# Patient Record
Sex: FEMALE | Race: WHITE | Employment: UNEMPLOYED | URBAN - METROPOLITAN AREA
[De-identification: names, ages, dates, MRNs, and addresses within clinical notes are randomized per-mention and may not be internally consistent; named-entity substitution may affect disease eponyms.]

---

## 2024-10-25 ENCOUNTER — TELEPHONE (OUTPATIENT)
Dept: OBSTETRICS AND GYNECOLOGY | Facility: CLINIC | Age: 19
End: 2024-10-25
Payer: COMMERCIAL

## 2024-11-21 ENCOUNTER — LAB VISIT (OUTPATIENT)
Dept: LAB | Facility: HOSPITAL | Age: 19
End: 2024-11-21
Attending: OBSTETRICS & GYNECOLOGY
Payer: COMMERCIAL

## 2024-11-21 ENCOUNTER — INITIAL PRENATAL (OUTPATIENT)
Dept: OBSTETRICS AND GYNECOLOGY | Facility: CLINIC | Age: 19
End: 2024-11-21
Payer: COMMERCIAL

## 2024-11-21 ENCOUNTER — OFFICE VISIT (OUTPATIENT)
Dept: OBSTETRICS AND GYNECOLOGY | Facility: CLINIC | Age: 19
End: 2024-11-21
Payer: COMMERCIAL

## 2024-11-21 VITALS
BODY MASS INDEX: 28.75 KG/M2 | DIASTOLIC BLOOD PRESSURE: 62 MMHG | BODY MASS INDEX: 31.24 KG/M2 | SYSTOLIC BLOOD PRESSURE: 114 MMHG | SYSTOLIC BLOOD PRESSURE: 114 MMHG | DIASTOLIC BLOOD PRESSURE: 62 MMHG | WEIGHT: 178.13 LBS | WEIGHT: 193.56 LBS

## 2024-11-21 DIAGNOSIS — Z34.01 ENCOUNTER FOR SUPERVISION OF NORMAL FIRST PREGNANCY, FIRST TRIMESTER: ICD-10-CM

## 2024-11-21 DIAGNOSIS — O21.9 NAUSEA/VOMITING IN PREGNANCY: ICD-10-CM

## 2024-11-21 DIAGNOSIS — Z32.01 ENCOUNTER FOR PREGNANCY TEST, RESULT POSITIVE: Primary | ICD-10-CM

## 2024-11-21 DIAGNOSIS — Z34.01 ENCOUNTER FOR SUPERVISION OF NORMAL FIRST PREGNANCY, FIRST TRIMESTER: Primary | ICD-10-CM

## 2024-11-21 DIAGNOSIS — Z3A.11 11 WEEKS GESTATION OF PREGNANCY: ICD-10-CM

## 2024-11-21 LAB
HBV SURFACE AG SERPL QL IA: NORMAL
HCV AB SERPL QL IA: NORMAL
HIV 1+2 AB+HIV1 P24 AG SERPL QL IA: NORMAL
TREPONEMA PALLIDUM IGG+IGM AB [PRESENCE] IN SERUM OR PLASMA BY IMMUNOASSAY: NONREACTIVE

## 2024-11-21 PROCEDURE — 99499 UNLISTED E&M SERVICE: CPT | Mod: S$GLB,,, | Performed by: OBSTETRICS & GYNECOLOGY

## 2024-11-21 PROCEDURE — 85025 COMPLETE CBC W/AUTO DIFF WBC: CPT | Performed by: OBSTETRICS & GYNECOLOGY

## 2024-11-21 PROCEDURE — 87340 HEPATITIS B SURFACE AG IA: CPT | Performed by: OBSTETRICS & GYNECOLOGY

## 2024-11-21 PROCEDURE — 86803 HEPATITIS C AB TEST: CPT | Performed by: OBSTETRICS & GYNECOLOGY

## 2024-11-21 PROCEDURE — 99999 PR PBB SHADOW E&M-EST. PATIENT-LVL III: CPT | Mod: PBBFAC,,, | Performed by: OBSTETRICS & GYNECOLOGY

## 2024-11-21 PROCEDURE — 86900 BLOOD TYPING SEROLOGIC ABO: CPT | Performed by: OBSTETRICS & GYNECOLOGY

## 2024-11-21 PROCEDURE — 86762 RUBELLA ANTIBODY: CPT | Performed by: OBSTETRICS & GYNECOLOGY

## 2024-11-21 PROCEDURE — 86787 VARICELLA-ZOSTER ANTIBODY: CPT | Performed by: OBSTETRICS & GYNECOLOGY

## 2024-11-21 PROCEDURE — 87491 CHLMYD TRACH DNA AMP PROBE: CPT | Performed by: OBSTETRICS & GYNECOLOGY

## 2024-11-21 PROCEDURE — 86593 SYPHILIS TEST NON-TREP QUANT: CPT | Performed by: OBSTETRICS & GYNECOLOGY

## 2024-11-21 PROCEDURE — 87661 TRICHOMONAS VAGINALIS AMPLIF: CPT | Performed by: OBSTETRICS & GYNECOLOGY

## 2024-11-21 PROCEDURE — 87389 HIV-1 AG W/HIV-1&-2 AB AG IA: CPT | Performed by: OBSTETRICS & GYNECOLOGY

## 2024-11-21 RX ORDER — PROMETHAZINE HYDROCHLORIDE 25 MG/1
25 TABLET ORAL EVERY 6 HOURS PRN
Qty: 30 TABLET | Refills: 1 | Status: SHIPPED | OUTPATIENT
Start: 2024-11-21

## 2024-11-21 RX ORDER — ASPIRIN 81 MG/1
81 TABLET ORAL DAILY
Qty: 150 TABLET | Refills: 2 | Status: SHIPPED | OUTPATIENT
Start: 2024-11-21 | End: 2025-11-21

## 2024-11-21 RX ORDER — PYRIDOXINE HCL (VITAMIN B6) 25 MG
25 TABLET ORAL 3 TIMES DAILY
Qty: 90 TABLET | Refills: 1 | Status: SHIPPED | OUTPATIENT
Start: 2024-11-21 | End: 2024-12-21

## 2024-11-21 NOTE — PATIENT INSTRUCTIONS
Nausea/Vomiting:   Small meals, bland diet, avoid over eating, avoid empty stomach.   BRAT diet: bananas, rice, apple sauce, & toast.     Convert prenatal vitamins to folic acid supplement only  Rashida Capsule 250mg 4x daily    Vit B6 (over the counter) 25mg oral 3-4x daily  Doxylamine (Unisom) 12.5mg oral 3-4x daily    Dramamine or Benadryl 25mg q4-6hr oral PRN     Promethazine (Phenergan) 25mg q4-6hr orally, rectally, IM, or IV

## 2024-11-21 NOTE — PROGRESS NOTES
Subjective:      Tammi Nicholson is a 19 y.o. female being seen today for her obstetrical visit. She is at 11w2d gestation. Patient reports nausea and vomiting.     OB ROS: no vaginal bleeding, no leakage of fluid, no contractions/cramping, no vaginal discharge. Fetal movement: too early.    Menstrual History:  OB History          1    Para   0    Term   0       0    AB   0    Living   0         SAB   0    IAB   0    Ectopic   0    Multiple   0    Live Births   0                Patient's last menstrual period was 2024.       Objective:      /62   Wt 87.8 kg (193 lb 9 oz)   LMP 2024   BMI 31.24 kg/m²     Vitals  BP: 114/62  Weight: 87.8 kg (193 lb 9 oz)  Prenatal  Fetal Heart Rate: 161  Movement: Absent  Fundal height not measured  -0 kg (-0 oz)           Prenatal Labs:  Lab Results   Component Value Date    [id number] Negative 2024       Assessment:      Pregnancy 11w2d      Plan:     EDC 6/10/2025 by 10wk ultrasound in ED  Nullip: start aspirin   UTI Tx   BMI: 31, recommended 11-20# gain  Nausea and vomiting: Recs  Genetic: discussed possible Quad        1. Encounter for supervision of normal first pregnancy, first trimester  -     Varicella Zoster Antibody, IgG; Future; Expected date: 2024  -     Type & Screen - Ob Profile; Future; Expected date: 2024  -     Rubella Antibody, IgG; Future; Expected date: 2024  -     HIV 1/2 Ag/Ab (4th Gen); Future; Expected date: 2024  -     Hepatitis C Antibody; Future; Expected date: 2024  -     Hepatitis B Surface Antigen; Future; Expected date: 2024  -     CBC Auto Differential; Future; Expected date: 2024  -     Treponema Pallidium Antibodies IgG, IgM; Future; Expected date: 2024  -     Connected MOM Enrollment  -     Assign Connected MOM Program Consent Questionnaire  -     aspirin (ECOTRIN) 81 MG EC tablet; Take 1 tablet (81 mg total) by mouth once daily. At 12 weeks start taking  aspirin daily to prevent preeclampsia.  Dispense: 150 tablet; Refill: 2    2. 11 weeks gestation of pregnancy    3. Nausea/vomiting in pregnancy  -     promethazine (PHENERGAN) 25 MG tablet; Take 1 tablet (25 mg total) by mouth every 6 (six) hours as needed for Nausea.  Dispense: 30 tablet; Refill: 1  -     doxylamine succinate (UNISOM, DOXYLAMINE,) 25 mg tablet; Take 1/2 tablet three times a day to prevent nausea/vomitting in pregnancy  -     pyridoxine, vitamin B6, (B-6) 25 MG Tab; Take 1 tablet (25 mg total) by mouth 3 (three) times daily.  Dispense: 90 tablet; Refill: 1         Follow up in 4 weeks.       I reviewed today her blood pressure, weight gain, urine results and  fetal heart rate.  I have reviewed the previous labs and ultrasound with the patient.   I have answered questions to her satisfaction and total of 20 minutes spend today

## 2024-11-22 LAB
ABO + RH BLD: NORMAL
BASOPHILS # BLD AUTO: 0.02 K/UL (ref 0–0.2)
BASOPHILS NFR BLD: 0.2 % (ref 0–1.9)
BLD GP AB SCN CELLS X3 SERPL QL: NORMAL
DIFFERENTIAL METHOD BLD: NORMAL
EOSINOPHIL # BLD AUTO: 0.1 K/UL (ref 0–0.5)
EOSINOPHIL NFR BLD: 0.9 % (ref 0–8)
ERYTHROCYTE [DISTWIDTH] IN BLOOD BY AUTOMATED COUNT: 12.9 % (ref 11.5–14.5)
HCT VFR BLD AUTO: 40.8 % (ref 37–48.5)
HGB BLD-MCNC: 13.4 G/DL (ref 12–16)
IMM GRANULOCYTES # BLD AUTO: 0.03 K/UL (ref 0–0.04)
IMM GRANULOCYTES NFR BLD AUTO: 0.3 % (ref 0–0.5)
LYMPHOCYTES # BLD AUTO: 2.4 K/UL (ref 1–4.8)
LYMPHOCYTES NFR BLD: 25.2 % (ref 18–48)
MCH RBC QN AUTO: 28.6 PG (ref 27–31)
MCHC RBC AUTO-ENTMCNC: 32.8 G/DL (ref 32–36)
MCV RBC AUTO: 87 FL (ref 82–98)
MONOCYTES # BLD AUTO: 0.7 K/UL (ref 0.3–1)
MONOCYTES NFR BLD: 6.7 % (ref 4–15)
NEUTROPHILS # BLD AUTO: 6.4 K/UL (ref 1.8–7.7)
NEUTROPHILS NFR BLD: 66.7 % (ref 38–73)
NRBC BLD-RTO: 0 /100 WBC
PLATELET # BLD AUTO: 249 K/UL (ref 150–450)
PMV BLD AUTO: 9.8 FL (ref 9.2–12.9)
RBC # BLD AUTO: 4.68 M/UL (ref 4–5.4)
RUBV IGG SER-ACNC: 14.7 IU/ML
RUBV IGG SER-IMP: REACTIVE
VARICELLA INTERPRETATION: NEGATIVE
VARICELLA ZOSTER IGG: 0.97 S/CO
WBC # BLD AUTO: 9.65 K/UL (ref 3.9–12.7)

## 2024-11-23 LAB
SPECIMEN SOURCE: NORMAL
T VAGINALIS RRNA SPEC QL NAA+PROBE: NEGATIVE

## 2024-11-25 LAB
C TRACH DNA SPEC QL NAA+PROBE: NOT DETECTED
N GONORRHOEA DNA SPEC QL NAA+PROBE: NOT DETECTED

## 2024-12-02 NOTE — PROCEDURES
Expand All Collapse All       Subjective:         Subjective  Tammi Nicholson is a 19 y.o. female being seen today for her obstetrical visit. She is at 11w2d gestation. Patient reports nausea and vomiting.      OB ROS: no vaginal bleeding, no leakage of fluid, no contractions/cramping, no vaginal discharge. Fetal movement: too early.     Menstrual History:  OB History            1    Para   0    Term   0       0    AB   0    Living   0           SAB   0    IAB   0    Ectopic   0    Multiple   0    Live Births   0                  Patient's last menstrual period was 2024.     Objective:         Objective  /62   Wt 87.8 kg (193 lb 9 oz)   LMP 2024   BMI 31.24 kg/m²      Vitals  BP: 114/62  Weight: 87.8 kg (193 lb 9 oz)  Prenatal  Fetal Heart Rate: 161  Movement: Absent  Fundal height not measured  -0 kg (-0 oz)                 Prenatal Labs:        Lab Results   Component Value Date     [id number] Negative 2024         Assessment:         Assessment  Pregnancy 11w2d         Plan:      EDC 6/10/2025 by 10wk ultrasound in ED  Nullip: start aspirin   UTI Tx   BMI: 31, recommended 11-20# gain  Nausea and vomiting: Recs  Genetic: discussed possible Quad        Plan  1. Encounter for supervision of normal first pregnancy, first trimester  -     Varicella Zoster Antibody, IgG; Future; Expected date: 2024  -     Type & Screen - Ob Profile; Future; Expected date: 2024  -     Rubella Antibody, IgG; Future; Expected date: 2024  -     HIV 1/2 Ag/Ab (4th Gen); Future; Expected date: 2024  -     Hepatitis C Antibody; Future; Expected date: 2024  -     Hepatitis B Surface Antigen; Future; Expected date: 2024  -     CBC Auto Differential; Future; Expected date: 2024  -     Treponema Pallidium Antibodies IgG, IgM; Future; Expected date: 2024  -     Connected MOM Enrollment  -     Assign Connected MOM Program Consent Questionnaire  -      aspirin (ECOTRIN) 81 MG EC tablet; Take 1 tablet (81 mg total) by mouth once daily. At 12 weeks start taking aspirin daily to prevent preeclampsia.  Dispense: 150 tablet; Refill: 2     2. 11 weeks gestation of pregnancy     3. Nausea/vomiting in pregnancy  -     promethazine (PHENERGAN) 25 MG tablet; Take 1 tablet (25 mg total) by mouth every 6 (six) hours as needed for Nausea.  Dispense: 30 tablet; Refill: 1  -     doxylamine succinate (UNISOM, DOXYLAMINE,) 25 mg tablet; Take 1/2 tablet three times a day to prevent nausea/vomitting in pregnancy  -     pyridoxine, vitamin B6, (B-6) 25 MG Tab; Take 1 tablet (25 mg total) by mouth 3 (three) times daily.  Dispense: 90 tablet; Refill: 1           Follow up in 4 weeks.         I reviewed today her blood pressure, weight gain, urine results and  fetal heart rate.  I have reviewed the previous labs and ultrasound with the patient.   I have answered questions to her satisfaction and total of 20 minutes spend today

## 2024-12-06 ENCOUNTER — TELEPHONE (OUTPATIENT)
Dept: OBSTETRICS AND GYNECOLOGY | Facility: CLINIC | Age: 19
End: 2024-12-06
Payer: COMMERCIAL

## 2024-12-06 NOTE — TELEPHONE ENCOUNTER
Pt called and stated she's having green vaginal discharge and dont have time to come in today due to work. Pt wanted to be seen Monday since she's off . Pt understood provider , time and date.

## 2024-12-09 ENCOUNTER — ROUTINE PRENATAL (OUTPATIENT)
Dept: OBSTETRICS AND GYNECOLOGY | Facility: CLINIC | Age: 19
End: 2024-12-09
Payer: COMMERCIAL

## 2024-12-09 VITALS
DIASTOLIC BLOOD PRESSURE: 68 MMHG | SYSTOLIC BLOOD PRESSURE: 110 MMHG | BODY MASS INDEX: 28.82 KG/M2 | WEIGHT: 178.56 LBS

## 2024-12-09 DIAGNOSIS — N76.0 ACUTE VAGINITIS: ICD-10-CM

## 2024-12-09 DIAGNOSIS — Z3A.13 13 WEEKS GESTATION OF PREGNANCY: Primary | ICD-10-CM

## 2024-12-09 LAB
BILIRUBIN, UA POC OHS: NEGATIVE
BLOOD, UA POC OHS: NEGATIVE
CLARITY, UA POC OHS: CLEAR
COLOR, UA POC OHS: YELLOW
GLUCOSE, UA POC OHS: NEGATIVE
KETONES, UA POC OHS: NEGATIVE
LEUKOCYTES, UA POC OHS: ABNORMAL
NITRITE, UA POC OHS: NEGATIVE
PH, UA POC OHS: 7
PROTEIN, UA POC OHS: NEGATIVE
SPECIFIC GRAVITY, UA POC OHS: 1.02
UROBILINOGEN, UA POC OHS: 0.2

## 2024-12-09 PROCEDURE — 87491 CHLMYD TRACH DNA AMP PROBE: CPT

## 2024-12-09 PROCEDURE — 0502F SUBSEQUENT PRENATAL CARE: CPT | Mod: CPTII,S$GLB,,

## 2024-12-09 PROCEDURE — 99999 PR PBB SHADOW E&M-EST. PATIENT-LVL III: CPT | Mod: PBBFAC,,,

## 2024-12-09 PROCEDURE — 0352U VAGINOSIS SCREEN BY DNA PROBE: CPT

## 2024-12-09 RX ORDER — TERCONAZOLE 8 MG/G
1 CREAM VAGINAL NIGHTLY
Qty: 20 G | Refills: 0 | Status: SHIPPED | OUTPATIENT
Start: 2024-12-09 | End: 2024-12-12

## 2024-12-11 NOTE — PROGRESS NOTES
The patient presents with complaints of green vaginal discharge for the last week. Denies bleeding or leaking. No urinary concerns.     2024  19 y.o. 14w1d Estimated Date of Delivery: 6/10/25, dating reviewed.   OB History    Para Term  AB Living   1 0 0 0 0 0   SAB IAB Ectopic Multiple Live Births   0 0 0 0 0      # Outcome Date GA Lbr Koko/2nd Weight Sex Type Anes PTL Lv   1 Current                Prenatal labs reviewed and updated today    Review of Systems:  General ROS: negative for headache or visual changes  Breast ROS: negative for breast lumps  Gastrointestinal ROS: negative for constipation, diarrhea or nausea/vomiting  Musculoskeletal ROS: negative for pain in joints or swelling in face or hands.   Neurological ROS: negative for - headaches, numbness/tingling or visual changes      Physical Exam:  /68   Wt 81 kg (178 lb 9.2 oz)   LMP 2024   BMI 28.82 kg/m²   Urine Dip: Pending  Fundal Height: deferred   Fetal Heart Tones: 143 bpm  Constitutional: She is oriented to person, place, and time. She appears well-developed and well-nourished. No distress. Normal weight   Cardiovascular: Normal rate.    Pulmonary/Chest: Effort normal. No respiratory distress  Abdominal: Soft, gravid, nontender. No rebound and no guarding.     Genitourinary: SSE performed. Thick, greenish discharge noted. Cervix appears closed. No bleeding    Musculoskeletal: Normal range of motion, no peripheral edema.   Neurological: She is alert and oriented to person, place, and time. Coordination normal.   Skin: Skin is warm and dry. She is not diaphoretic.  Psychiatric: She has a normal mood and affect.        Assessment:  19 y.o., at 14w1d Gestation   There is no problem list on file for this patient.    Current Outpatient Medications on File Prior to Visit   Medication Sig Dispense Refill    aspirin (ECOTRIN) 81 MG EC tablet Take 1 tablet (81 mg total) by mouth once daily. At 12 weeks start taking aspirin  daily to prevent preeclampsia. 150 tablet 2    prenatal 21-iron fu-folic acid 14 mg iron- 400 mcg Tab Take by mouth.      promethazine (PHENERGAN) 25 MG tablet Take 1 tablet (25 mg total) by mouth every 6 (six) hours as needed for Nausea. 30 tablet 1    doxylamine succinate (UNISOM, DOXYLAMINE,) 25 mg tablet Take 1/2 tablet three times a day to prevent nausea/vomitting in pregnancy (Patient not taking: Reported on 12/9/2024)      pyridoxine, vitamin B6, (B-6) 25 MG Tab Take 1 tablet (25 mg total) by mouth 3 (three) times daily. (Patient not taking: Reported on 12/9/2024) 90 tablet 1     No current facility-administered medications on file prior to visit.       Plan:   Labs today: vaginal cultures obtained  Orders today: none  MEds today: Terazol 3 sent to preferred pharmacy   Procedures Today: none  Follow up 2 Weeks, bleeding/pain precautions, kick counts, labor precautions

## 2024-12-13 ENCOUNTER — TELEPHONE (OUTPATIENT)
Dept: OBSTETRICS AND GYNECOLOGY | Facility: CLINIC | Age: 19
End: 2024-12-13
Payer: COMMERCIAL

## 2024-12-13 NOTE — TELEPHONE ENCOUNTER
----- Message from Eddie sent at 12/13/2024  3:09 PM CST -----  Contact: Self  Type: Needs Medical Advice    Who Called:  Patient    Best Call Back Number: [phone]    Additional Information: Patient is requesting a call back as soon as possible.

## 2024-12-13 NOTE — TELEPHONE ENCOUNTER
Pt wanted to confirm if she can use monistat7 Over the counter. Told p[t she can . Pt had no further question or concerns.

## 2024-12-14 LAB
BACTERIAL VAGINOSIS DNA: NOT DETECTED
C TRACH DNA SPEC QL NAA+PROBE: NOT DETECTED
CANDIDA GLABRATA/KRUSEI: NOT DETECTED
CANDIDA RRNA VAG QL PROBE: NOT DETECTED
N GONORRHOEA DNA SPEC QL NAA+PROBE: NOT DETECTED
TRICHOMONAS VAGINALIS: NOT DETECTED

## 2024-12-19 ENCOUNTER — ROUTINE PRENATAL (OUTPATIENT)
Dept: OBSTETRICS AND GYNECOLOGY | Facility: CLINIC | Age: 19
End: 2024-12-19
Payer: COMMERCIAL

## 2024-12-19 VITALS
WEIGHT: 177.94 LBS | SYSTOLIC BLOOD PRESSURE: 120 MMHG | BODY MASS INDEX: 28.72 KG/M2 | DIASTOLIC BLOOD PRESSURE: 72 MMHG

## 2024-12-19 DIAGNOSIS — Z34.01 ENCOUNTER FOR SUPERVISION OF NORMAL FIRST PREGNANCY, FIRST TRIMESTER: Primary | ICD-10-CM

## 2024-12-19 LAB
BILIRUBIN, UA POC OHS: NEGATIVE
BLOOD, UA POC OHS: NEGATIVE
CLARITY, UA POC OHS: CLEAR
COLOR, UA POC OHS: YELLOW
GLUCOSE, UA POC OHS: NEGATIVE
KETONES, UA POC OHS: NEGATIVE
LEUKOCYTES, UA POC OHS: ABNORMAL
NITRITE, UA POC OHS: NEGATIVE
PH, UA POC OHS: 7
PROTEIN, UA POC OHS: ABNORMAL
SPECIFIC GRAVITY, UA POC OHS: 1.02
UROBILINOGEN, UA POC OHS: 1

## 2024-12-19 PROCEDURE — 87086 URINE CULTURE/COLONY COUNT: CPT | Performed by: OBSTETRICS & GYNECOLOGY

## 2024-12-19 PROCEDURE — 99999 PR PBB SHADOW E&M-EST. PATIENT-LVL III: CPT | Mod: PBBFAC,,, | Performed by: OBSTETRICS & GYNECOLOGY

## 2024-12-19 NOTE — PROGRESS NOTES
Subjective:      Tammi Nicholson is a 19 y.o. female being seen today for her obstetrical visit. She is at 15w2d gestation. Patient reports no complaints.     OB ROS: no vaginal bleeding, no leakage of fluid, no contractions/cramping, no vaginal discharge. Fetal movement: too early.    Menstrual History:  OB History          1    Para   0    Term   0       0    AB   0    Living   0         SAB   0    IAB   0    Ectopic   0    Multiple   0    Live Births   0                Patient's last menstrual period was 2024.       Objective:      /72   Wt 80.7 kg (177 lb 14.6 oz)   LMP 2024   BMI 28.72 kg/m²     Vitals  BP: 120/72  Weight: 80.7 kg (177 lb 14.6 oz)  Prenatal  Fetal Heart Rate: 144  Movement: Absent  Fundal height not measured  -7.1 kg (-15 lb 10.4 oz)           Prenatal Labs:  Lab Results   Component Value Date    GROUPTRH O POS 2024    HGB 13.4 2024    HCT 40.8 2024     2024    RUBELLAIMMUN Reactive 2024    HEPBSAG Non-reactive 2024    [id number] Non-reactive 2024    LABCHLA Not Detected 2024    LABNGO Not Detected 2024    [id number] Negative 2024       Assessment:      Pregnancy 15w2d      Plan:     EDC 6/10/2025 by 10wk ultrasound in ED  Nullip: aspirin   UTI Tx   BMI: 31, recommended 11-20# gain  Nausea and vomiting: Recs, lost 15#; improved with Zofran. No vomiting in 2nd tri.   Genetic: declines  Vaccines: flu 2024  Given [facility] class info        1. Encounter for supervision of normal first pregnancy, first trimester  -     POCT Urinalysis(Instrument)  -     US OB 14+ Wks TransAbd & TransVag, Single Gestation (XPD); Future; Expected date: 2024  -     CULTURE, URINE  -     influenza (Egg-FREE) (Flucelvax) 45 mcg/0.5 mL IM vaccine (> or = 6 mo) 0.5 mL         Follow up in 4 weeks.       I reviewed today her blood pressure, weight gain, urine results and  fetal heart rate.  I have reviewed the  previous labs and ultrasound with the patient.   I have answered questions to her satisfaction and total of 20 minutes spend today

## 2024-12-20 LAB — BACTERIA UR CULT: NO GROWTH

## 2024-12-24 ENCOUNTER — PATIENT MESSAGE (OUTPATIENT)
Dept: OTHER | Facility: OTHER | Age: 19
End: 2024-12-24
Payer: COMMERCIAL

## 2025-01-16 ENCOUNTER — HOSPITAL ENCOUNTER (OUTPATIENT)
Dept: RADIOLOGY | Facility: HOSPITAL | Age: 20
Discharge: HOME | End: 2025-01-16
Attending: OBSTETRICS & GYNECOLOGY
Payer: COMMERCIAL

## 2025-01-16 ENCOUNTER — ROUTINE PRENATAL (OUTPATIENT)
Dept: OBSTETRICS AND GYNECOLOGY | Facility: CLINIC | Age: 20
End: 2025-01-16
Payer: COMMERCIAL

## 2025-01-16 VITALS
WEIGHT: 184.94 LBS | DIASTOLIC BLOOD PRESSURE: 72 MMHG | SYSTOLIC BLOOD PRESSURE: 118 MMHG | BODY MASS INDEX: 29.85 KG/M2

## 2025-01-16 DIAGNOSIS — O28.3 ABNORMAL FETAL ULTRASOUND: ICD-10-CM

## 2025-01-16 DIAGNOSIS — Z34.01 ENCOUNTER FOR SUPERVISION OF NORMAL FIRST PREGNANCY, FIRST TRIMESTER: ICD-10-CM

## 2025-01-16 DIAGNOSIS — K21.9 GASTROESOPHAGEAL REFLUX DISEASE WITHOUT ESOPHAGITIS: ICD-10-CM

## 2025-01-16 DIAGNOSIS — Z34.01 ENCOUNTER FOR SUPERVISION OF NORMAL FIRST PREGNANCY, FIRST TRIMESTER: Primary | ICD-10-CM

## 2025-01-16 LAB
BILIRUBIN, UA POC OHS: NEGATIVE
BLOOD, UA POC OHS: NEGATIVE
CLARITY, UA POC OHS: CLEAR
COLOR, UA POC OHS: YELLOW
GLUCOSE, UA POC OHS: NEGATIVE
KETONES, UA POC OHS: NEGATIVE
LEUKOCYTES, UA POC OHS: ABNORMAL
NITRITE, UA POC OHS: NEGATIVE
PH, UA POC OHS: 7
PROTEIN, UA POC OHS: NEGATIVE
SPECIFIC GRAVITY, UA POC OHS: 1.01
UROBILINOGEN, UA POC OHS: 1

## 2025-01-16 PROCEDURE — 0502F SUBSEQUENT PRENATAL CARE: CPT | Mod: CPTII,S$GLB,, | Performed by: OBSTETRICS & GYNECOLOGY

## 2025-01-16 PROCEDURE — 76805 OB US >/= 14 WKS SNGL FETUS: CPT | Mod: TC,PN

## 2025-01-16 PROCEDURE — 99999 PR PBB SHADOW E&M-EST. PATIENT-LVL III: CPT | Mod: PBBFAC,,, | Performed by: OBSTETRICS & GYNECOLOGY

## 2025-01-16 PROCEDURE — 76805 OB US >/= 14 WKS SNGL FETUS: CPT | Mod: 26,,, | Performed by: RADIOLOGY

## 2025-01-16 RX ORDER — OMEPRAZOLE 20 MG/1
20 CAPSULE, DELAYED RELEASE ORAL DAILY
Qty: 30 CAPSULE | Refills: 11 | Status: SHIPPED | OUTPATIENT
Start: 2025-01-16 | End: 2026-01-16

## 2025-01-16 NOTE — PROGRESS NOTES
Subjective:      Tammi Nicholson is a 19 y.o. female being seen today for her obstetrical visit. She is at 19w2d gestation. Patient reports heartburn.     OB ROS: no vaginal bleeding, no leakage of fluid, no contractions/cramping, no vaginal discharge. Fetal movement: normal.    Menstrual History:  OB History          1    Para   0    Term   0       0    AB   0    Living   0         SAB   0    IAB   0    Ectopic   0    Multiple   0    Live Births   0                Patient's last menstrual period was 2024.       Objective:      /72   Wt 83.9 kg (184 lb 15.5 oz)   LMP 2024   BMI 29.85 kg/m²     Vitals  BP: 118/72  Weight: 83.9 kg (184 lb 15.5 oz)  Prenatal  Fetal Heart Rate: 142 U/S  Movement: Present  Fundal height not measured  -3.9 kg (-8 lb 9.6 oz)           Prenatal Labs:  Lab Results   Component Value Date    GROUPTRH O POS 2024    HGB 13.4 2024    HCT 40.8 2024     2024    RUBELLAIMMUN Reactive 2024    HEPBSAG Non-reactive 2024    [id number] Non-reactive 2024    LABCHLA Not Detected 2024    LABNGO Not Detected 2024    LABURIN No growth 2024    [id number] Negative 2024       Assessment:      Pregnancy 19w2d      Plan:     EDC 6/10/2025 by 10wk ultrasound in ED  Suboptimal LVOT, nose/lips  Nullip: aspirin   UTI Tx   BMI: 31, recommended 11-20# gain  Nausea and vomiting: Recs, lost 15#; improved with Zofran. No vomiting in 2nd tri.   Genetic: declines  Vaccines: flu 2024  Given [facility] class info  Peds: unsure  Epidural or natural  Contraception: desires  Breastfeeding, needs new pump.   GERD: rx sent.         1. Encounter for supervision of normal first pregnancy, first trimester  -     POCT Urinalysis(Instrument)    2. Abnormal fetal ultrasound  -     US OB Limited 1 Or More Gestations; Future; Expected date: 2025    3. Gastroesophageal reflux disease without esophagitis  -     omeprazole  (PRILOSEC) 20 MG capsule; Take 1 capsule (20 mg total) by mouth once daily.  Dispense: 30 capsule; Refill: 11         Follow up in 4 weeks.       I reviewed today her blood pressure, weight gain, urine results and  fetal heart rate.  I have reviewed the previous labs and ultrasound with the patient.   I have answered questions to her satisfaction and total of 20 minutes spend today

## 2025-01-21 ENCOUNTER — PATIENT MESSAGE (OUTPATIENT)
Dept: OTHER | Facility: OTHER | Age: 20
End: 2025-01-21
Payer: COMMERCIAL

## 2025-01-31 ENCOUNTER — TELEPHONE (OUTPATIENT)
Dept: OBSTETRICS AND GYNECOLOGY | Facility: CLINIC | Age: 20
End: 2025-01-31
Payer: COMMERCIAL

## 2025-01-31 NOTE — TELEPHONE ENCOUNTER
----- Message from Judy sent at 1/31/2025 12:42 PM CST -----  Type:  Needs Medical Advice    Who Called: pt    Symptoms (please be specific): na     How long has patient had these symptoms:  na    Pharmacy name and phone #:  na    Would the patient rather a call back or a response via [facility]? Call back    Best Call Back Number: [phone]      Additional Information: pt is calling to speak with someone in the office in regards to [employer] BP      Please call Back to advise. Thanks!

## 2025-01-31 NOTE — TELEPHONE ENCOUNTER
Returned pt call , I told pt I will send a message to  regarding her Breast Pump RX. Pt understood and had no further questions.

## 2025-02-13 ENCOUNTER — ROUTINE PRENATAL (OUTPATIENT)
Dept: OBSTETRICS AND GYNECOLOGY | Facility: CLINIC | Age: 20
End: 2025-02-13
Payer: COMMERCIAL

## 2025-02-13 ENCOUNTER — HOSPITAL ENCOUNTER (OUTPATIENT)
Dept: RADIOLOGY | Facility: HOSPITAL | Age: 20
Discharge: HOME | End: 2025-02-13
Attending: OBSTETRICS & GYNECOLOGY
Payer: COMMERCIAL

## 2025-02-13 VITALS
DIASTOLIC BLOOD PRESSURE: 70 MMHG | BODY MASS INDEX: 31.85 KG/M2 | SYSTOLIC BLOOD PRESSURE: 112 MMHG | WEIGHT: 197.31 LBS

## 2025-02-13 DIAGNOSIS — Z30.9 ENCOUNTER FOR CONTRACEPTIVE MANAGEMENT, UNSPECIFIED TYPE: ICD-10-CM

## 2025-02-13 DIAGNOSIS — O28.3 ABNORMAL FETAL ULTRASOUND: ICD-10-CM

## 2025-02-13 DIAGNOSIS — Z34.01 ENCOUNTER FOR SUPERVISION OF NORMAL FIRST PREGNANCY, FIRST TRIMESTER: Primary | ICD-10-CM

## 2025-02-13 LAB
BILIRUBIN, UA POC OHS: NEGATIVE
BLOOD, UA POC OHS: NEGATIVE
CLARITY, UA POC OHS: CLEAR
COLOR, UA POC OHS: YELLOW
GLUCOSE, UA POC OHS: NEGATIVE
KETONES, UA POC OHS: NEGATIVE
LEUKOCYTES, UA POC OHS: ABNORMAL
NITRITE, UA POC OHS: NEGATIVE
PH, UA POC OHS: 8.5
PROTEIN, UA POC OHS: ABNORMAL
SPECIFIC GRAVITY, UA POC OHS: 1.02
UROBILINOGEN, UA POC OHS: 0.2

## 2025-02-13 PROCEDURE — 76815 OB US LIMITED FETUS(S): CPT | Mod: TC,PN

## 2025-02-13 PROCEDURE — 87086 URINE CULTURE/COLONY COUNT: CPT | Performed by: OBSTETRICS & GYNECOLOGY

## 2025-02-13 PROCEDURE — 99999 PR PBB SHADOW E&M-EST. PATIENT-LVL III: CPT | Mod: PBBFAC,,, | Performed by: OBSTETRICS & GYNECOLOGY

## 2025-02-13 PROCEDURE — 76815 OB US LIMITED FETUS(S): CPT | Mod: 26,,, | Performed by: RADIOLOGY

## 2025-02-13 NOTE — PROGRESS NOTES
Subjective:      Tammi Nicholson is a 19 y.o. female being seen today for her obstetrical visit. She is at 23w2d gestation. Patient reports no complaints.     OB ROS: no vaginal bleeding, no leakage of fluid, no contractions/cramping, no vaginal discharge. Fetal movement: normal.    Menstrual History:  OB History          1    Para   0    Term   0       0    AB   0    Living   0         SAB   0    IAB   0    Ectopic   0    Multiple   0    Live Births   0                Patient's last menstrual period was 2024.       Objective:      /70   Wt 89.5 kg (197 lb 5 oz)   LMP 2024   BMI 31.85 kg/m²     Vitals  BP: 112/70  Weight: 89.5 kg (197 lb 5 oz)  Prenatal  Fetal Heart Rate: 152  Movement: Present  Fundal height not measured  1.7 kg (3 lb 12 oz)           Prenatal Labs:  Lab Results   Component Value Date    GROUPTRH O POS 2024    HGB 13.4 2024    HCT 40.8 2024     2024    RUBELLAIMMUN Reactive 2024    HEPBSAG Non-reactive 2024    [id number] Non-reactive 2024    LABCHLA Not Detected 2024    LABNGO Not Detected 2024    LABURIN No growth 2024    [id number] Negative 2024       Assessment:      Pregnancy 23w2d      Plan:     EDC 6/10/2025 by 10wk ultrasound in ED  Nullip: aspirin   UTI Tx   BMI: 31, recommended 11-20# gain  Nausea and vomiting: Recs, lost 15#; improved with Zofran. No vomiting in 2nd tri.   Genetic: declines  Vaccines: flu 2024  Given [facility] class info  Peds: unsure  Epidural or natural  Contraception: desires  Breastfeeding, needs new pump.         1. Encounter for supervision of normal first pregnancy, first trimester  -     POCT Urinalysis(Instrument)  -     OB Glucose Screen; Future; Expected date: 2025    2. Encounter for contraceptive management, unspecified type  -     Cancel: Device Authorization Order         Follow up in 4 weeks.       I reviewed today her blood pressure,  weight gain, urine results and  fetal heart rate.  I have reviewed the previous labs and ultrasound with the patient.   I have answered questions to her satisfaction and total of 20 minutes spend today

## 2025-02-15 LAB
BACTERIA UR CULT: NORMAL
BACTERIA UR CULT: NORMAL

## 2025-02-16 DIAGNOSIS — K21.9 GASTROESOPHAGEAL REFLUX DISEASE WITHOUT ESOPHAGITIS: ICD-10-CM

## 2025-02-17 RX ORDER — OMEPRAZOLE 20 MG/1
20 CAPSULE, DELAYED RELEASE ORAL DAILY
Qty: 30 CAPSULE | Refills: 11 | Status: SHIPPED | OUTPATIENT
Start: 2025-02-17 | End: 2026-02-17

## 2025-02-17 NOTE — TELEPHONE ENCOUNTER
Refill Routing Note   Medication(s) are not appropriate for processing by [facility] for the following reason(s):        New or recently adjusted medication  Drug-disease interaction    [facility] action(s):  Defer      Medication Therapy Plan: pregnancy      Appointments  past 12m or future 3m with PCP    Date Provider   Last Visit   2/13/2025 Soha Mello MD   Next Visit   3/13/2025 Soha Mello MD   ED visits in past 90 days: 0        Note composed:10:32 AM 02/17/2025

## 2025-02-18 ENCOUNTER — PATIENT MESSAGE (OUTPATIENT)
Dept: OTHER | Facility: OTHER | Age: 20
End: 2025-02-18
Payer: COMMERCIAL

## 2025-02-28 ENCOUNTER — ROUTINE PRENATAL (OUTPATIENT)
Dept: OBSTETRICS AND GYNECOLOGY | Facility: CLINIC | Age: 20
End: 2025-02-28
Payer: COMMERCIAL

## 2025-02-28 VITALS
SYSTOLIC BLOOD PRESSURE: 120 MMHG | WEIGHT: 203.25 LBS | BODY MASS INDEX: 32.81 KG/M2 | DIASTOLIC BLOOD PRESSURE: 78 MMHG

## 2025-02-28 DIAGNOSIS — Z11.3 SCREENING FOR STDS (SEXUALLY TRANSMITTED DISEASES): ICD-10-CM

## 2025-02-28 DIAGNOSIS — O26.872 SHORT CERVIX, ANTEPARTUM, SECOND TRIMESTER: Primary | ICD-10-CM

## 2025-02-28 DIAGNOSIS — O09.892 RISK OF PRETERM LABOR IN SECOND TRIMESTER: ICD-10-CM

## 2025-02-28 DIAGNOSIS — Z34.01 ENCOUNTER FOR SUPERVISION OF NORMAL FIRST PREGNANCY, FIRST TRIMESTER: Primary | ICD-10-CM

## 2025-02-28 PROCEDURE — 81515 NFCT DS BV&VAGINITIS DNA ALG: CPT | Performed by: OBSTETRICS & GYNECOLOGY

## 2025-02-28 PROCEDURE — 87591 N.GONORRHOEAE DNA AMP PROB: CPT | Performed by: OBSTETRICS & GYNECOLOGY

## 2025-02-28 PROCEDURE — 99999 PR PBB SHADOW E&M-EST. PATIENT-LVL III: CPT | Mod: PBBFAC,,, | Performed by: OBSTETRICS & GYNECOLOGY

## 2025-02-28 RX ORDER — PROGESTERONE 200 MG/1
200 CAPSULE ORAL
COMMUNITY
Start: 2025-02-24

## 2025-02-28 NOTE — PROGRESS NOTES
Subjective:      Tammi Nicholson is a 19 y.o. female being seen today for her obstetrical visit. She is at 25w3d gestation. Patient reports  seen in ER for pain, diagnostic with short cervix, advised to follow up. States pain improved.  .     OB ROS: no vaginal bleeding, no leakage of fluid, no contractions/cramping, no vaginal discharge. Fetal movement: normal.    Menstrual History:  OB History          1    Para   0    Term   0       0    AB   0    Living   0         SAB   0    IAB   0    Ectopic   0    Multiple   0    Live Births   0                Patient's last menstrual period was 2024.       Objective:      /78   Wt 92.2 kg (203 lb 4.2 oz)   LMP 2024   BMI 32.81 kg/m²     Vitals  BP: 120/78  Weight: 92.2 kg (203 lb 4.2 oz)  Prenatal  Fetal Heart Rate: 144  Movement: Present  Dilation/Effacement/Station  Dilation: Closed  Fundal height not measured  4.4 kg (9 lb 11.2 oz)    Speculum exam: no vaginal discharge, no bleeding, cervix closed.        Prenatal Labs:  Lab Results   Component Value Date    GROUPTRH O POS 2024    HGB 13.4 2024    HCT 40.8 2024     2024    RUBELLAIMMUN Reactive 2024    HEPBSAG Non-reactive 2024    [id number] Non-reactive 2024    LABCHLA Not Detected 2024    LABNGO Not Detected 2024    LABURIN  2025     Multiple organisms isolated. None in predominance.  Repeat if    LABURIN clinically necessary. 2025    [id number] Negative 2024       Assessment:      Pregnancy 25w3d      Plan:     EDC 6/10/2025 by 10wk ultrasound in ED  Nullip: aspirin   UTI Tx   BMI: 31, recommended 11-20# gain  Nausea and vomiting: Recs, lost 15#; improved with Zofran. No vomiting in 2nd tri.   Genetic: declines  Vaccines: flu 2024  Given [facility] cla  ss info  Peds: unsure  Epidural or natural  Contraception: desires  Breastfeeding, needs new pump.     Seen at  ER at 24.6wga, cervix 22mm, & FFN  negative, started on vaginal progesterone by Dr. Ramos. A short cervix (defined here as <30 mm) before 34 w  eeks of gestation is predictive of an increased risk for  birth in all populations.   Closed today, cramping improved. Referral to [facility] sent.         1. Encounter for supervision of normal first pregnancy, first trimester  -     POCT Urinalysis(Instrument)    2. Risk of  labor in second trimester  -     Ambulatory referral/consult to Perinatology; Future; Expected date: 2025         Follow up in 4 weeks.       I reviewed today her blood pressure, weight gain, urine results and  fetal heart rate.  I have reviewed the previous labs and ultrasound with the patient.   I have answered questions to her satisfaction and total of 20 minutes spend today

## 2025-03-04 ENCOUNTER — PATIENT MESSAGE (OUTPATIENT)
Dept: OTHER | Facility: OTHER | Age: 20
End: 2025-03-04
Payer: COMMERCIAL

## 2025-03-10 ENCOUNTER — OFFICE VISIT (OUTPATIENT)
Dept: MATERNAL FETAL MEDICINE | Facility: CLINIC | Age: 20
End: 2025-03-10
Payer: COMMERCIAL

## 2025-03-10 ENCOUNTER — PROCEDURE VISIT (OUTPATIENT)
Dept: MATERNAL FETAL MEDICINE | Facility: CLINIC | Age: 20
End: 2025-03-10
Payer: COMMERCIAL

## 2025-03-10 VITALS
DIASTOLIC BLOOD PRESSURE: 67 MMHG | HEIGHT: 66 IN | HEART RATE: 90 BPM | BODY MASS INDEX: 33.22 KG/M2 | SYSTOLIC BLOOD PRESSURE: 123 MMHG | WEIGHT: 206.69 LBS

## 2025-03-10 DIAGNOSIS — O09.892 RISK OF PRETERM LABOR IN SECOND TRIMESTER: ICD-10-CM

## 2025-03-10 DIAGNOSIS — Z03.79 ENCOUNTER FOR OTHER SUSPECTED MATERNAL AND FETAL CONDITIONS RULED OUT: Primary | ICD-10-CM

## 2025-03-10 DIAGNOSIS — O26.872 SHORT CERVIX, ANTEPARTUM, SECOND TRIMESTER: ICD-10-CM

## 2025-03-10 PROCEDURE — 99999 PR PBB SHADOW E&M-EST. PATIENT-LVL III: CPT | Mod: PBBFAC,,, | Performed by: STUDENT IN AN ORGANIZED HEALTH CARE EDUCATION/TRAINING PROGRAM

## 2025-03-10 PROCEDURE — 3074F SYST BP LT 130 MM HG: CPT | Mod: CPTII,S$GLB,, | Performed by: STUDENT IN AN ORGANIZED HEALTH CARE EDUCATION/TRAINING PROGRAM

## 2025-03-10 PROCEDURE — 3008F BODY MASS INDEX DOCD: CPT | Mod: CPTII,S$GLB,, | Performed by: STUDENT IN AN ORGANIZED HEALTH CARE EDUCATION/TRAINING PROGRAM

## 2025-03-10 PROCEDURE — 76805 OB US >/= 14 WKS SNGL FETUS: CPT | Mod: S$GLB,,, | Performed by: STUDENT IN AN ORGANIZED HEALTH CARE EDUCATION/TRAINING PROGRAM

## 2025-03-10 PROCEDURE — 1159F MED LIST DOCD IN RCRD: CPT | Mod: CPTII,S$GLB,, | Performed by: STUDENT IN AN ORGANIZED HEALTH CARE EDUCATION/TRAINING PROGRAM

## 2025-03-10 PROCEDURE — 99214 OFFICE O/P EST MOD 30 MIN: CPT | Mod: S$GLB,,, | Performed by: STUDENT IN AN ORGANIZED HEALTH CARE EDUCATION/TRAINING PROGRAM

## 2025-03-10 PROCEDURE — 3078F DIAST BP <80 MM HG: CPT | Mod: CPTII,S$GLB,, | Performed by: STUDENT IN AN ORGANIZED HEALTH CARE EDUCATION/TRAINING PROGRAM

## 2025-03-10 NOTE — PROGRESS NOTES
"Maternal Fetal Medicine Follow up  SUBJECTIVE:     Tammi Nicholson is a 19 y.o.  female with IUP at 26w6d who is seen for MFM follow up for management of:    Problem   Encounter for Other Suspected Maternal and Fetal Conditions Ruled Out     Previous notes reviewed.   No changes to medical, surgical, family, social, or obstetric history.        Review of patient's allergies indicates:  No Known Allergies  Care team members:  Dr. Mello - Primary OB  OBJECTIVE:   Blood Pressure: /67   Pulse 90   Ht 5' 6" (1.676 m)   Wt 93.8 kg (206 lb 10.9 oz)   LMP 2024   BMI 33.36 kg/m²   Ultrasound performed. See viewpoint for full ultrasound report.  A brink living IUP is identified.   Fetal size is appropriate for established dating.   No fetal structural malformations are identified; however, the anatomic survey is incomplete as noted above. The patient will be scheduled for a f/u exam to complete the   anatomic survey.   Cervical length by TA scanning is normal.   Placental location is posterior without evidence of previa.   ASSESSMENT/PLAN:     19 y.o.  female with IUP at 26w6d presents for MFM follow up.    Encounter for other suspected maternal and fetal conditions ruled out  Tammi presents for a consultation secondary to concern for short cervix on ED ultrasound. She presented with cramping and was found to have a cervical length of 2.4 cm. She was started on vaginal progesterone and one week later, cervix measuring > 3 cm.  On our assessment today, no shortening visualized. In patients without a history of  birth and a short cervix, vaginal progesterone is indicated when cervical length is  < 25 mm.  Given prior measurement, reasonable to continue medication.   We reviewed the decreased incidence of  birth in women with a short cervix without a history or  birth who were given nightly vaginal progesterone. Based on her current cervical length, closed cervix, and no " history of a prior  birth,and gestational age she is not currently a cerclage candidate.           We briefly reviewed genetic screening options. She is interested in genetic screening however will consider and discuss with primary OB  Lonny Fernandez  Maternal-Fetal Medicine    Electronically Signed by Lonny Fernandez March 10, 2025

## 2025-03-10 NOTE — ASSESSMENT & PLAN NOTE
Tammi presents for a consultation secondary to concern for short cervix on ED ultrasound. She presented with cramping and was found to have a cervical length of 2.4 cm. She was started on vaginal progesterone and one week later, cervix measuring > 3 cm.  On our assessment today, no shortening visualized. In patients without a history of  birth and a short cervix, vaginal progesterone is indicated when cervical length is  < 25 mm.  Given prior measurement, reasonable to continue medication.   We reviewed the decreased incidence of  birth in women with a short cervix without a history or  birth who were given nightly vaginal progesterone. Based on her current cervical length, closed cervix, and no history of a prior  birth,and gestational age she is not currently a cerclage candidate.

## 2025-03-13 ENCOUNTER — ROUTINE PRENATAL (OUTPATIENT)
Dept: OBSTETRICS AND GYNECOLOGY | Facility: CLINIC | Age: 20
End: 2025-03-13
Payer: COMMERCIAL

## 2025-03-13 ENCOUNTER — LAB VISIT (OUTPATIENT)
Dept: LAB | Facility: HOSPITAL | Age: 20
End: 2025-03-13
Attending: OBSTETRICS & GYNECOLOGY
Payer: COMMERCIAL

## 2025-03-13 VITALS
WEIGHT: 207.25 LBS | SYSTOLIC BLOOD PRESSURE: 126 MMHG | DIASTOLIC BLOOD PRESSURE: 66 MMHG | BODY MASS INDEX: 33.45 KG/M2

## 2025-03-13 DIAGNOSIS — Z34.01 ENCOUNTER FOR SUPERVISION OF NORMAL FIRST PREGNANCY, FIRST TRIMESTER: ICD-10-CM

## 2025-03-13 DIAGNOSIS — Z34.01 ENCOUNTER FOR SUPERVISION OF NORMAL FIRST PREGNANCY, FIRST TRIMESTER: Primary | ICD-10-CM

## 2025-03-13 DIAGNOSIS — Z3A.27 27 WEEKS GESTATION OF PREGNANCY: ICD-10-CM

## 2025-03-13 LAB
BILIRUBIN, UA POC OHS: NEGATIVE
BLOOD, UA POC OHS: NEGATIVE
CLARITY, UA POC OHS: CLEAR
COLOR, UA POC OHS: YELLOW
GLUCOSE SERPL-MCNC: 118 MG/DL (ref 70–140)
GLUCOSE, UA POC OHS: NEGATIVE
KETONES, UA POC OHS: NEGATIVE
LEUKOCYTES, UA POC OHS: ABNORMAL
NITRITE, UA POC OHS: NEGATIVE
PH, UA POC OHS: 8.5
PROTEIN, UA POC OHS: 30
SPECIFIC GRAVITY, UA POC OHS: 1.02
UROBILINOGEN, UA POC OHS: 0.2

## 2025-03-13 PROCEDURE — 87086 URINE CULTURE/COLONY COUNT: CPT | Performed by: OBSTETRICS & GYNECOLOGY

## 2025-03-13 PROCEDURE — 36415 COLL VENOUS BLD VENIPUNCTURE: CPT | Mod: PN | Performed by: OBSTETRICS & GYNECOLOGY

## 2025-03-13 PROCEDURE — 0502F SUBSEQUENT PRENATAL CARE: CPT | Mod: CPTII,S$GLB,, | Performed by: OBSTETRICS & GYNECOLOGY

## 2025-03-13 PROCEDURE — 82950 GLUCOSE TEST: CPT | Performed by: OBSTETRICS & GYNECOLOGY

## 2025-03-13 PROCEDURE — 99999 PR PBB SHADOW E&M-EST. PATIENT-LVL III: CPT | Mod: PBBFAC,,, | Performed by: OBSTETRICS & GYNECOLOGY

## 2025-03-13 NOTE — PROGRESS NOTES
Subjective:      Tammi iNcholson is a 19 y.o. female being seen today for her obstetrical visit. She is at 27w2d gestation. Patient reports no complaints.     OB ROS: no vaginal bleeding, no leakage of fluid, no contractions/cramping, no vaginal discharge. Fetal movement: normal.    Menstrual History:  OB History          1    Para   0    Term   0       0    AB   0    Living   0         SAB   0    IAB   0    Ectopic   0    Multiple   0    Live Births   0                Patient's last menstrual period was 2024.       Objective:      /66   Wt 94 kg (207 lb 3.7 oz)   LMP 2024   BMI 33.45 kg/m²     Vitals  BP: 126/66  Weight: 94 kg (207 lb 3.7 oz)  Prenatal  Fundal Height (cm): 25 cm  Fetal Heart Rate: 151  Movement: Present  Fundal height size equal to dates  6.2 kg (13 lb 10.7 oz)           Prenatal Labs:  Lab Results   Component Value Date    GROUPTRH O POS 2024    HGB 13.4 2024    HCT 40.8 2024     2024    RUBELLAIMMUN Reactive 2024    HEPBSAG Non-reactive 2024    [id number] Non-reactive 2024    LABCHLA Not Detected 2025    LABNGO Not Detected 2025    LABURIN  2025     Multiple organisms isolated. None in predominance.  Repeat if    LABURIN clinically necessary. 2025    [id number] Negative 2024       Assessment:      Pregnancy 27w2d      Plan:     EDC 6/10/2025 by 10wk ultrasound in ED  Nullip: aspirin   UTI Tx   BMI: 31, recommended 11-20# gain  Nausea and vomiting: Recs, lost 15#; improved with Zofran. No vomiting in 2nd tri.   Genetic: declines  Vaccines: flu 2024  Given [facility] class info  Peds: unsure  Epidural or natural  Contraception: desires  Breastfeeding, needs new pump.   Seen at  [facility] at 24.6wga, cervix 22mm, & FFN negative, started on vaginal progesterone by Dr. Ramos. A short cervix (defined here as <30 mm) before 34   weeks of gestation is predictive of an increased risk for   birth in all populations. [facility] ultrasound then showed normal CL.     Patient desires to deliver at [place]. I advised my group does not have privileges at . She will need to find a new Dr. She sates she will see Dr. Barros. Patient transferred care to Dr. Barros at 27.2wk.       1. Encounter for supervision of normal first pregnancy, first trimester         Follow up in 2 weeks.       I reviewed today her blood pressure, weight gain, urine results and  fetal heart rate.  I have reviewed the previous labs and ultrasound with the patient.   I have answered questions to her satisfaction and total of 20 minutes spend today

## 2025-03-15 LAB
BACTERIA UR CULT: NORMAL
BACTERIA UR CULT: NORMAL

## 2025-03-17 ENCOUNTER — RESULTS FOLLOW-UP (OUTPATIENT)
Dept: OBSTETRICS AND GYNECOLOGY | Facility: CLINIC | Age: 20
End: 2025-03-17

## 2025-03-18 ENCOUNTER — PATIENT MESSAGE (OUTPATIENT)
Dept: OTHER | Facility: OTHER | Age: 20
End: 2025-03-18
Payer: COMMERCIAL

## 2025-03-18 DIAGNOSIS — K21.9 GASTROESOPHAGEAL REFLUX DISEASE WITHOUT ESOPHAGITIS: ICD-10-CM

## 2025-03-19 RX ORDER — OMEPRAZOLE 20 MG/1
20 CAPSULE, DELAYED RELEASE ORAL DAILY
Qty: 30 CAPSULE | Refills: 11 | Status: SHIPPED | OUTPATIENT
Start: 2025-03-19 | End: 2026-03-19

## 2025-03-19 NOTE — TELEPHONE ENCOUNTER
Refill Routing Note   Medication(s) are not appropriate for processing by [facility] for the following reason(s):        Other    [facility] action(s):  Defer        Medication Therapy Plan: Need negative pregnancy status check      Appointments  past 12m or future 3m with PCP    Date Provider   Last Visit   3/13/2025 Soha Mello MD   Next Visit   Visit date not found Soha Mello MD   ED visits in past 90 days: 0        Note composed:12:09 PM 03/19/2025

## 2025-04-01 ENCOUNTER — PATIENT MESSAGE (OUTPATIENT)
Dept: OTHER | Facility: OTHER | Age: 20
End: 2025-04-01
Payer: COMMERCIAL

## 2025-04-07 ENCOUNTER — PROCEDURE VISIT (OUTPATIENT)
Dept: MATERNAL FETAL MEDICINE | Facility: CLINIC | Age: 20
End: 2025-04-07
Payer: COMMERCIAL

## 2025-04-07 DIAGNOSIS — Z36.2 ENCOUNTER FOR FOLLOW-UP ULTRASOUND OF FETAL ANATOMY: ICD-10-CM

## 2025-04-07 PROCEDURE — 76816 OB US FOLLOW-UP PER FETUS: CPT | Mod: S$GLB,,, | Performed by: OBSTETRICS & GYNECOLOGY

## 2025-04-15 ENCOUNTER — PATIENT MESSAGE (OUTPATIENT)
Dept: OTHER | Facility: OTHER | Age: 20
End: 2025-04-15
Payer: COMMERCIAL

## 2025-04-21 DIAGNOSIS — K21.9 GASTROESOPHAGEAL REFLUX DISEASE WITHOUT ESOPHAGITIS: ICD-10-CM

## 2025-04-25 RX ORDER — OMEPRAZOLE 20 MG/1
20 CAPSULE, DELAYED RELEASE ORAL DAILY
Qty: 30 CAPSULE | Refills: 11 | Status: SHIPPED | OUTPATIENT
Start: 2025-04-25 | End: 2026-04-25

## 2025-05-06 ENCOUNTER — PATIENT MESSAGE (OUTPATIENT)
Dept: OTHER | Facility: OTHER | Age: 20
End: 2025-05-06
Payer: COMMERCIAL